# Patient Record
Sex: MALE | Race: WHITE
[De-identification: names, ages, dates, MRNs, and addresses within clinical notes are randomized per-mention and may not be internally consistent; named-entity substitution may affect disease eponyms.]

---
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
## 2018-01-27 ENCOUNTER — HOSPITAL ENCOUNTER (EMERGENCY)
Dept: HOSPITAL 45 - C.EDB | Age: 18
Discharge: HOME | End: 2018-01-27
Payer: COMMERCIAL

## 2018-01-27 VITALS — OXYGEN SATURATION: 99 % | DIASTOLIC BLOOD PRESSURE: 68 MMHG | HEART RATE: 65 BPM | SYSTOLIC BLOOD PRESSURE: 117 MMHG

## 2018-01-27 VITALS
WEIGHT: 174.61 LBS | HEIGHT: 74.02 IN | WEIGHT: 174.61 LBS | BODY MASS INDEX: 22.41 KG/M2 | HEIGHT: 74.02 IN | BODY MASS INDEX: 22.41 KG/M2

## 2018-01-27 VITALS — TEMPERATURE: 98.06 F

## 2018-01-27 DIAGNOSIS — Z87.442: ICD-10-CM
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
## 2018-01-27 DIAGNOSIS — M25.461: ICD-10-CM

## 2018-01-27 DIAGNOSIS — S83.004A: Primary | ICD-10-CM

## 2018-01-27 NOTE — DIAGNOSTIC IMAGING REPORT
R KNEE 3 VIEWS



CLINICAL HISTORY: Medial right knee pain following injury.    



COMPARISON: Right knee radiographs July 3, 2016.



FINDINGS:  Alignment of the right knee is anatomic. There is a moderate size

right knee joint effusion. There is a 1.4 cm subchondral focus of irregularity

of the medial patella. This is age indeterminate however likely old. No

suspicious lesion is present.



IMPRESSION: 



1. No acute fracture.



2. Moderate size right knee joint effusion.



3. 1.4 cm subchondral focus of irregularity of the medial patella. This is age

indeterminate however probably old and and may reflect an osteochondral injury

from previous lateral patellar dislocation. Anatomic alignment on current exam.







Electronically signed by:  Guillermo Ziegler M.D.

1/27/2018 1:55 PM



Dictated Date/Time:  1/27/2018 1:49 PM

## 2021-01-06 NOTE — EMERGENCY ROOM VISIT NOTE
History


First contact with patient:  13:33


Chief Complaint:  KNEEPAIN


Stated Complaint:  KNEE PAIN





History of Present Illness


The patient is a 17 year old male who presents to the Emergency Room with 

complaints of right knee pain.  The patient reports that he was in a basketball 

tournament today and twisted the knee.  The patient reports a prior history of 

patellar dislocations.  He was seen by an orthopedic surgeon that performed an 

MRI, and suggested physical therapy for the knee.  The patient did not undergo 

therapy, and has not had any continued problems with the knee.  He did wear a 

hinged brace for a period of time after his last knee injury.  He currently 

denies any pain extending into the fire leg.  He denies paresthesias or 

numbness of the right foot and toes, and rates his discomfort a 3 out of 10.





Review of Systems


10 system review was performed and was negative except for pertinent positives 

and negatives as indicated in history of present illness





Past Medical/Surgical History


Medical Problems:


(1) Kidney stones


Surgical Problems:


(1) History of brain surgery








Family History





No significant family history





Social History


Smoking Status:  Never Smoker


Alcohol Use:  occasionally


Drug Use:  none


Marital Status:  single


Occupation Status:  [employer] student





Current/Historical Medications


Scheduled


Sertraline (Zoloft), 25 MG PO QAM





Physical Exam


Vital Signs











  Date Time  Temp Pulse Resp B/P (MAP) Pulse Ox O2 Delivery O2 Flow Rate FiO2


 


1/27/18 15:10  65 15 117/68 99   


 


1/27/18 13:14 36.7 83 18 131/73 99 Room Air  











Physical Exam


CONSTITUTIONAL:  Healthy and well nourished.  Alert and oriented X 3 with 

positive affect.  Patient does not appear in any acute distress.


HEENT:  Normocephalic, atraumatic.  Pupils equal, round and reactive.  


NECK:  Full active range of motion without discomfort.


MUSCULOSKELETAL: Examination of the right knee does not show a mild joint 

effusion.  The patient is tenderness to palpation about the patella.  

Collateral ligaments are intact with no tenderness to palpation of the joint 

lines.  Negative anterior draw, negative posterior drawer, negative pivot 

shift.  No tenderness to palpation through the hamstrings.  Distal pulses are 

intact.


INTEGUMENTARY:  No rash or other significant dermatologic conditions noted.


NEUROLOGIC:  No focal neurologic deficits noted.  Right lower extremity is 

sensory intact.





Medical Decision & Procedures


ER Provider


Diagnostic Interpretation:


My interpretation of right knee x-rays does not show any acute fractures or 

dislocation.  A moderate joint effusion is noted.  Radiologist report is as 

follows:





R KNEE 3 VIEWS





CLINICAL HISTORY: Medial right knee pain following injury.    





COMPARISON: Right knee radiographs July 3, 2016.





FINDINGS:  Alignment of the right knee is anatomic. There is a moderate size


right knee joint effusion. There is a 1.4 cm subchondral focus of irregularity


of the medial patella. This is age indeterminate however likely old. No


suspicious lesion is present.





IMPRESSION: 





1. No acute fracture.





2. Moderate size right knee joint effusion.





3. 1.4 cm subchondral focus of irregularity of the medial patella. This is age


indeterminate however probably old and and may reflect an osteochondral injury


from previous lateral patellar dislocation. Anatomic alignment on current exam.





ED Course


Patient history and physical exam were performed.  Nurse's notes were reviewed.

  Vital signs were reviewed and were normal.  The patient refused any 

analgesics on initial exam.  X-rays of the right knee shows a joint effusion 

without any obvious fractures or dislocations.  The patient was offered a knee 

immobilizer but refused.  Patient was dispensed crutches, and instructed to 

remain limited weightbearing.  The patient was encouraged to follow-up with 

orthopedics if symptoms are not improving within the next week.  He was 

encouraged to avoid sports or other physical activities that involve a lot of 

kneeling, squatting or flexion of the knees.  The patient was happy with plan 

of care, voiced understanding of all discharge instructions, and rated his 

overall discomfort a 3 out of 10 at the time of discharge.





Medical Decision


Based on physical exam findings, I high suspect that the patient suffered a 

patellar subluxation/dislocation.  It is currently reduced.  The patient 

refused any immobilizer.  He was encouraged to limit activities for now.  Other 

internal joint derangement or occult fractures cannot be ruled out





Medication Reconcilliation


Current Medication List:  was personally reviewed by me





Blood Pressure Screening


Patient's blood pressure:  Normal blood pressure





Impression





 Primary Impression:  


 Closed dislocation of right patella





Departure Information


Referrals


No Doctor, Assigned (PCP)





Patient Instructions


My [facility]





Problem Qualifiers








 Primary Impression:  


 Closed dislocation of right patella


 Encounter type:  initial encounter  Qualified Codes:  S83.004A - Unspecified 

dislocation of right patella, initial encounter partner